# Patient Record
Sex: FEMALE | Race: WHITE | NOT HISPANIC OR LATINO | URBAN - METROPOLITAN AREA
[De-identification: names, ages, dates, MRNs, and addresses within clinical notes are randomized per-mention and may not be internally consistent; named-entity substitution may affect disease eponyms.]

---

## 2020-10-26 ENCOUNTER — APPOINTMENT (OUTPATIENT)
Dept: URBAN - METROPOLITAN AREA CLINIC 219 | Age: 75
Setting detail: DERMATOLOGY
End: 2020-10-26

## 2020-10-26 DIAGNOSIS — L82.1 OTHER SEBORRHEIC KERATOSIS: ICD-10-CM

## 2020-10-26 DIAGNOSIS — L82.0 INFLAMED SEBORRHEIC KERATOSIS: ICD-10-CM

## 2020-10-26 PROCEDURE — OTHER LIQUID NITROGEN: OTHER

## 2020-10-26 PROCEDURE — OTHER COUNSELING: OTHER

## 2020-10-26 PROCEDURE — OTHER REASSURANCE: OTHER

## 2020-10-26 PROCEDURE — 17110 DESTRUCT B9 LESION 1-14: CPT

## 2020-10-26 PROCEDURE — 99202 OFFICE O/P NEW SF 15 MIN: CPT | Mod: 25

## 2020-10-26 PROCEDURE — OTHER MIPS QUALITY: OTHER

## 2020-10-26 ASSESSMENT — LOCATION ZONE DERM
LOCATION ZONE: TRUNK
LOCATION ZONE: ARM
LOCATION ZONE: NECK

## 2020-10-26 ASSESSMENT — LOCATION SIMPLE DESCRIPTION DERM
LOCATION SIMPLE: RIGHT FOREARM
LOCATION SIMPLE: RIGHT UPPER BACK
LOCATION SIMPLE: NECK
LOCATION SIMPLE: LEFT FOREARM
LOCATION SIMPLE: ABDOMEN
LOCATION SIMPLE: LEFT ANTERIOR NECK
LOCATION SIMPLE: RIGHT CLAVICULAR SKIN
LOCATION SIMPLE: LEFT SHOULDER

## 2020-10-26 ASSESSMENT — LOCATION DETAILED DESCRIPTION DERM
LOCATION DETAILED: LEFT VENTRAL DISTAL FOREARM
LOCATION DETAILED: RIGHT CLAVICULAR SKIN
LOCATION DETAILED: RIGHT CENTRAL LATERAL NECK
LOCATION DETAILED: EPIGASTRIC SKIN
LOCATION DETAILED: LEFT CLAVICULAR NECK
LOCATION DETAILED: RIGHT CENTRAL POSTERIOR NECK
LOCATION DETAILED: LEFT ANTERIOR SHOULDER
LOCATION DETAILED: SUBXIPHOID
LOCATION DETAILED: RIGHT MEDIAL UPPER BACK
LOCATION DETAILED: RIGHT DISTAL DORSAL FOREARM

## 2025-04-15 ENCOUNTER — DASHBOARD ENCOUNTERS (OUTPATIENT)
Age: 80
End: 2025-04-15

## 2025-04-15 ENCOUNTER — OFFICE VISIT (OUTPATIENT)
Dept: URBAN - NONMETROPOLITAN AREA CLINIC 2 | Facility: CLINIC | Age: 80
End: 2025-04-15
Payer: MEDICARE

## 2025-04-15 DIAGNOSIS — K59.09 CHRONIC CONSTIPATION: ICD-10-CM

## 2025-04-15 DIAGNOSIS — K21.9 CHRONIC GERD: ICD-10-CM

## 2025-04-15 DIAGNOSIS — Z83.719 FAMILY HISTORY OF COLONIC POLYPS: ICD-10-CM

## 2025-04-15 DIAGNOSIS — Z86.0101 PERSONAL HISTORY OF ADENOMATOUS AND SERRATED COLON POLYPS: ICD-10-CM

## 2025-04-15 DIAGNOSIS — R14.0 BLOATING: ICD-10-CM

## 2025-04-15 PROBLEM — 235595009: Status: ACTIVE | Noted: 2025-04-15

## 2025-04-15 PROBLEM — 428283002: Status: ACTIVE | Noted: 2025-04-15

## 2025-04-15 PROBLEM — 236069009: Status: ACTIVE | Noted: 2025-04-15

## 2025-04-15 PROBLEM — 429969003: Status: ACTIVE | Noted: 2025-04-15

## 2025-04-15 PROBLEM — 116289008: Status: ACTIVE | Noted: 2025-04-15

## 2025-04-15 PROCEDURE — 99203 OFFICE O/P NEW LOW 30 MIN: CPT | Performed by: INTERNAL MEDICINE

## 2025-04-15 NOTE — HPI-TODAY'S VISIT:
/15/25 4/15/2025 The patient is a 79-year-old female who is referred by Dr. Sneha Mojica for constipation, bloating, history of polyps, and history of reflux.  A copy of this consultation will be sent to the referring physician.  The patient is a 79-year-old female who has been following Dr. Garrett in the past.  She presents today to establish a gastroenterologist.  She thinks she is due for repeat colonoscopy in 6 to 12 months.  She does struggle with alternating diarrhea and constipation.  Today, she feels she has had more problems with bloating and constipation.  Today, we have discussed adding in daily Metamucil and MiraLAX if needed.  I would encourage her to increase her water intake and exercise.  Likely, she will be due for repeat colonoscopy at next visit.  We are going to try to obtain her colonoscopy report from her previous gastroenterologist.  She has also been having problems with chronic reflux and bloating.  She does take nightly famotidine and Reglan.  She is not on a PPI.  Today, we have discussed stopping the Reglan and only taking this as needed.  I would consider adding back in a PPI if she feels like her reflux is worsened once her bowels are more regular.  She feels that probiotics have helped with her bloating.  She is currently taking align.  She had blood work done recently by her PCP, and according to the patient, this was all normal.  Today, we have discussed changing her bowel regimen to add in Metamucil and possibly MiraLAX, taking nightly famotidine and only Reglan as needed.  We will see her back in the office in 6 months for further evaluation.